# Patient Record
Sex: MALE | Race: WHITE
[De-identification: names, ages, dates, MRNs, and addresses within clinical notes are randomized per-mention and may not be internally consistent; named-entity substitution may affect disease eponyms.]

---

## 2018-04-07 ENCOUNTER — HOSPITAL ENCOUNTER (OUTPATIENT)
Dept: HOSPITAL 47 - EC | Age: 38
Setting detail: OBSERVATION
LOS: 1 days | Discharge: HOME | End: 2018-04-08
Attending: HOSPITALIST | Admitting: HOSPITALIST
Payer: COMMERCIAL

## 2018-04-07 VITALS — BODY MASS INDEX: 33.6 KG/M2

## 2018-04-07 DIAGNOSIS — Z82.0: ICD-10-CM

## 2018-04-07 DIAGNOSIS — Z82.49: ICD-10-CM

## 2018-04-07 DIAGNOSIS — F17.200: ICD-10-CM

## 2018-04-07 DIAGNOSIS — R07.89: Primary | ICD-10-CM

## 2018-04-07 LAB
ALBUMIN SERPL-MCNC: 3.7 G/DL (ref 3.5–5)
ALP SERPL-CCNC: 80 U/L (ref 38–126)
ALT SERPL-CCNC: 19 U/L (ref 21–72)
ANION GAP SERPL CALC-SCNC: 14 MMOL/L
APTT BLD: 24.6 SEC (ref 22–30)
AST SERPL-CCNC: 18 U/L (ref 17–59)
BASOPHILS # BLD AUTO: 0 K/UL (ref 0–0.2)
BASOPHILS NFR BLD AUTO: 0 %
BUN SERPL-SCNC: 12 MG/DL (ref 9–20)
CALCIUM SPEC-MCNC: 9.3 MG/DL (ref 8.4–10.2)
CHLORIDE SERPL-SCNC: 108 MMOL/L (ref 98–107)
CK SERPL-CCNC: 76 U/L (ref 55–170)
CO2 SERPL-SCNC: 24 MMOL/L (ref 22–30)
D DIMER PPP FEU-MCNC: 0.23 MG/L FEU (ref ?–0.6)
EOSINOPHIL # BLD AUTO: 0.4 K/UL (ref 0–0.7)
EOSINOPHIL NFR BLD AUTO: 4 %
ERYTHROCYTE [DISTWIDTH] IN BLOOD BY AUTOMATED COUNT: 5.66 M/UL (ref 4.3–5.9)
ERYTHROCYTE [DISTWIDTH] IN BLOOD: 13.8 % (ref 11.5–15.5)
GLUCOSE SERPL-MCNC: 101 MG/DL (ref 74–99)
HCT VFR BLD AUTO: 46.2 % (ref 39–53)
HGB BLD-MCNC: 15.6 GM/DL (ref 13–17.5)
INR PPP: 1.1 (ref ?–1.2)
LYMPHOCYTES # SPEC AUTO: 2.8 K/UL (ref 1–4.8)
LYMPHOCYTES NFR SPEC AUTO: 27 %
MAGNESIUM SPEC-SCNC: 2 MG/DL (ref 1.6–2.3)
MCH RBC QN AUTO: 27.6 PG (ref 25–35)
MCHC RBC AUTO-ENTMCNC: 33.8 G/DL (ref 31–37)
MCV RBC AUTO: 81.6 FL (ref 80–100)
MONOCYTES # BLD AUTO: 0.7 K/UL (ref 0–1)
MONOCYTES NFR BLD AUTO: 7 %
NEUTROPHILS # BLD AUTO: 6.2 K/UL (ref 1.3–7.7)
NEUTROPHILS NFR BLD AUTO: 61 %
PLATELET # BLD AUTO: 270 K/UL (ref 150–450)
POTASSIUM SERPL-SCNC: 4.1 MMOL/L (ref 3.5–5.1)
PROT SERPL-MCNC: 6.2 G/DL (ref 6.3–8.2)
PT BLD: 10.7 SEC (ref 9–12)
SODIUM SERPL-SCNC: 146 MMOL/L (ref 137–145)
TROPONIN I SERPL-MCNC: <0.012 NG/ML (ref 0–0.03)
WBC # BLD AUTO: 10.3 K/UL (ref 3.8–10.6)

## 2018-04-07 PROCEDURE — 96374 THER/PROPH/DIAG INJ IV PUSH: CPT

## 2018-04-07 PROCEDURE — 85610 PROTHROMBIN TIME: CPT

## 2018-04-07 PROCEDURE — 85379 FIBRIN DEGRADATION QUANT: CPT

## 2018-04-07 PROCEDURE — 99285 EMERGENCY DEPT VISIT HI MDM: CPT

## 2018-04-07 PROCEDURE — 96361 HYDRATE IV INFUSION ADD-ON: CPT

## 2018-04-07 PROCEDURE — 94760 N-INVAS EAR/PLS OXIMETRY 1: CPT

## 2018-04-07 PROCEDURE — 82550 ASSAY OF CK (CPK): CPT

## 2018-04-07 PROCEDURE — 36415 COLL VENOUS BLD VENIPUNCTURE: CPT

## 2018-04-07 PROCEDURE — 84484 ASSAY OF TROPONIN QUANT: CPT

## 2018-04-07 PROCEDURE — 80053 COMPREHEN METABOLIC PANEL: CPT

## 2018-04-07 PROCEDURE — 83735 ASSAY OF MAGNESIUM: CPT

## 2018-04-07 PROCEDURE — 82553 CREATINE MB FRACTION: CPT

## 2018-04-07 PROCEDURE — 85025 COMPLETE CBC W/AUTO DIFF WBC: CPT

## 2018-04-07 PROCEDURE — 80061 LIPID PANEL: CPT

## 2018-04-07 PROCEDURE — 93005 ELECTROCARDIOGRAM TRACING: CPT

## 2018-04-07 PROCEDURE — 85730 THROMBOPLASTIN TIME PARTIAL: CPT

## 2018-04-07 PROCEDURE — 71046 X-RAY EXAM CHEST 2 VIEWS: CPT

## 2018-04-07 NOTE — XR
EXAMINATION TYPE: XR chest 2V

 

DATE OF EXAM: 4/7/2018

 

COMPARISON: NONE

 

HISTORY: Chest pain.

 

TECHNIQUE:  Frontal and lateral views of the chest are obtained.

 

FINDINGS:  There is no focal air space opacity, pleural effusion, or pneumothorax seen.  The cardiac 
silhouette size is within normal limits.   The osseous structures are intact.

 

IMPRESSION:  No acute  process.

## 2018-04-07 NOTE — ED
Chest Pain HPI





- General


Chief Complaint: Chest Pain


Stated Complaint: Chest pain


Time Seen by Provider: 04/07/18 20:35


Source: patient


Mode of arrival: wheelchair


Limitations: no limitations





- History of Present Illness


Initial Comments: 





87 years old male comes in with a chest pain he had a chest pain today around 5:

30 and lasted for about a half an hour 45 minutes had a chest pain is all 

resolved now he was short winded of the time he had a chest pain and now he has 

a family history of heart disease his dad has a fast heart attack in her early 

40s, he said it hurts when he takes a deep breaths denies any fever no chills 

no abdominal pain no frequency urgency dysuria no symptoms of TIA or CVA





- Related Data


 Previous Rx's











 Medication  Instructions  Recorded


 


Acetaminophen-Codeine 300-30mg 1 tab PO Q6H PRN #20 tablet 01/07/15





[Tylenol #3]  


 


Ibuprofen [Motrin] 600 mg PO Q8HR PRN #30 tab 01/07/15











 Allergies











Allergy/AdvReac Type Severity Reaction Status Date / Time


 


No Known Allergies Allergy   Verified 04/07/18 20:21














Review of Systems


ROS Statement: 


Those systems with pertinent positive or pertinent negative responses have been 

documented in the HPI.





ROS Other: All systems not noted in ROS Statement are negative.





EKG Findings





- EKG Comments:


EKG Findings:: EKG is sinus rhythm ventricular rate is 96 HI interval is 172 

QRS duration is 100 QT/QTc C is 54/447 review of this EKG does not reveal any 

ST elevation or ST depression





Past Medical History


Past Medical History: No Reported History


History of Any Multi-Drug Resistant Organisms: None Reported


Past Surgical History: Appendectomy, Orthopedic Surgery, Tonsillectomy


Additional Past Surgical History / Comment(s): RIGHT  AND LEFT KNEE,


Past Psychological History: No Psychological Hx Reported


Smoking Status: Current every day smoker


Past Alcohol Use History: Occasional


Past Drug Use History: None Reported





General Exam





- General Exam Comments


Initial Comments: 


General:  The patient is awake and alert, in no distress, and does not appear 

acutely ill. 


Skin:  Skin is warm and dry and no rashes or lesions are noted. 


Eye:  Pupils are equal, round and reactive to light, extra-ocular movements are 

intact; there is normal conjunctiva bilaterally.  


Ears, nose, mouth and throat:  There are moist mucous membranes and no oral 

lesions. 


Neck:  The neck is supple, there is no tenderness  or JVD.  


Cardiovascular:  There is a regular rate and rhythm. No murmur, rub or gallop 

is appreciated.


Respiratory: To auscultation bilateral, no wheezing no rhonchi no distress  

respiratory wise noticed


Gastrointestinal:  Soft, non-distended, non-tender abdomen without masses or 

organomegaly noted. There is no rebound or guarding present. Bowel sounds are 

unremarkable. 


Back:  There is no tenderness to palpation in the midline. There is no obvious 

deformity.


Musculoskeletal:  Normal ROM, no tenderness, There is no pedal edema. There is 

no calf tenderness or swelling. No cords were appreciated.  


Neurological:  CN II-XII intact, Cranial nerves III through XII are intact. 

There are no obvious motor or sensory deficits. Coordination appears grossly 

intact. Speech is normal.


Psychiatric:  Cooperative, appropriate mood & affect, normal judgment.  








Limitations: no limitations





Course





 Vital Signs











  04/07/18 04/07/18





  20:17 22:40


 


Temperature 97.9 F 


 


Pulse Rate 103 H 84


 


Respiratory 18 16





Rate  


 


Blood Pressure 140/78 127/61


 


O2 Sat by Pulse 98 98





Oximetry  








During the assessment notices EKG is normal so his chest x-ray d-dimer is 

unremarkable upon and is unremarkable CBC and compressive metabolic panel all 

within normal range considering his 37 considering is a smoker his dad has a 

heart attack in early 40s he took observed overnight for 3 sets of cardiac 

markers and consult with the cardiology





Disposition


Clinical Impression: 


 Chest pain, Family history of heart disease





Disposition: ADMITTED AS IP TO THIS HOSP


Condition: Good


Referrals: 


None,Stated [Primary Care Provider] - 1-2 days

## 2018-04-08 VITALS
DIASTOLIC BLOOD PRESSURE: 77 MMHG | SYSTOLIC BLOOD PRESSURE: 135 MMHG | TEMPERATURE: 98 F | HEART RATE: 81 BPM | RESPIRATION RATE: 16 BRPM

## 2018-04-08 LAB
CHOLEST SERPL-MCNC: 147 MG/DL (ref ?–200)
CK SERPL-CCNC: 46 U/L (ref 55–170)
CK SERPL-CCNC: 47 U/L (ref 55–170)
HDLC SERPL-MCNC: 31 MG/DL (ref 40–60)
LDLC SERPL CALC-MCNC: 99 MG/DL (ref 0–99)
TRIGL SERPL-MCNC: 85 MG/DL (ref ?–150)
TROPONIN I SERPL-MCNC: <0.012 NG/ML (ref 0–0.03)
TROPONIN I SERPL-MCNC: <0.012 NG/ML (ref 0–0.03)

## 2018-04-08 NOTE — P.CRDCN
History of Present Illness


Consult date: 04/08/18


Chief complaint: Chest pain


History of present illness: 





This is a pleasant 37-year-old gentleman with no significant past medical 

history with history of smoking and history of coronary artery disease in his 

father presented to the hospital complaining of chest discomfort.  He described 

the discomfort as a sharp kind of discomfort, in the mid of the chest, without 

any radiation to the arm or neck or shoulders and without any associated 

symptoms of shortness of breath, sweating, dizziness or lightheadedness, or 

syncope.





The chest discomfort on physical examination is clearly reproducible.  The 

cardiac enzymes were checked and came in to be unremarkable.  The EKG did not 

show any ischemic changes.





I did recommend obtaining a stress test to rule out any severe underlying CAD 

with his family history and with a history of smoking with the patient would 

like to be discharged home and have it done as an outpatient.  I will get him 

up and around and if he is asymptomatic he might be able to be discharged home.





Past Medical History


Past Medical History: No Reported History


History of Any Multi-Drug Resistant Organisms: None Reported


Past Surgical History: Appendectomy, Orthopedic Surgery, Tonsillectomy


Additional Past Surgical History / Comment(s): RIGHT  AND LEFT KNEE,


Past Anesthesia/Blood Transfusion Reactions: No Reported Reaction


Past Psychological History: No Psychological Hx Reported


Smoking Status: Current every day smoker


Past Alcohol Use History: Occasional


Past Drug Use History: None Reported





- Past Family History


  ** Mother


Family Medical History: Seizure Disorder





  ** Father


Additional Family Medical History / Comment(s): AICD





Medications and Allergies


 Home Medications











 Medication  Instructions  Recorded  Confirmed  Type


 


Acetaminophen-Codeine 300-30mg 1 tab PO Q6H PRN #20 tablet 01/07/15  Rx





[Tylenol #3]    


 


Ibuprofen [Motrin] 600 mg PO Q8HR PRN #30 tab 01/07/15  Rx











 Allergies











Allergy/AdvReac Type Severity Reaction Status Date / Time


 


No Known Allergies Allergy   Verified 04/07/18 20:21














Physical Exam


Vitals: 


 Vital Signs











  Temp Pulse Pulse Resp BP BP Pulse Ox


 


 04/08/18 08:00  97.8 F   67  18   133/65  97


 


 04/08/18 07:25        94 L


 


 04/08/18 04:00  98.0 F   60  18   132/71  96


 


 04/08/18 00:13  97.9 F   76  16   109/76  99


 


 04/07/18 22:40   84   16  127/61   98


 


 04/07/18 20:17  97.9 F  103 H   18  140/78   98








 Intake and Output











 04/07/18 04/08/18 04/08/18





 22:59 06:59 14:59


 


Other:   


 


  Voiding Method   Toilet


 


  Weight 118.841 kg 118.841 kg 














- Constitutional


General appearance: no acute distress





- Respiratory


Respiratory: bilateral: CTA





- Cardiovascular


Rhythm: regular


Heart sounds: normal: S1, S2





Results





 04/07/18 21:19





 04/07/18 21:19


 Cardiac Enzymes











  04/07/18 04/07/18 04/08/18 Range/Units





  21:19 21:19 02:39 


 


AST   18   (17-59)  U/L


 


CK-MB (CK-2)  0.4   0.5  (0.0-2.4)  ng/mL


 


Troponin I  <0.012   <0.012  (0.000-0.034)  ng/mL














  04/08/18 Range/Units





  09:13 


 


AST   (17-59)  U/L


 


CK-MB (CK-2)  0.6  (0.0-2.4)  ng/mL


 


Troponin I  <0.012  (0.000-0.034)  ng/mL








 Coagulation











  04/07/18 Range/Units





  21:19 


 


PT  10.7  (9.0-12.0)  sec


 


APTT  24.6  (22.0-30.0)  sec








 Lipids











  04/08/18 Range/Units





  02:39 


 


Triglycerides  85  (<150)  mg/dL


 


Cholesterol  147  (<200)  mg/dL


 


HDL Cholesterol  31 L  (40-60)  mg/dL








 CBC











  04/07/18 Range/Units





  21:19 


 


WBC  10.3  (3.8-10.6)  k/uL


 


RBC  5.66  (4.30-5.90)  m/uL


 


Hgb  15.6  (13.0-17.5)  gm/dL


 


Hct  46.2  (39.0-53.0)  %


 


Plt Count  270  (150-450)  k/uL








 Comprehensive Metabolic Panel











  04/07/18 Range/Units





  21:19 


 


Sodium  146 H  (137-145)  mmol/L


 


Potassium  4.1  (3.5-5.1)  mmol/L


 


Chloride  108 H  ()  mmol/L


 


Carbon Dioxide  24  (22-30)  mmol/L


 


BUN  12  (9-20)  mg/dL


 


Creatinine  1.10  (0.66-1.25)  mg/dL


 


Glucose  101 H  (74-99)  mg/dL


 


Calcium  9.3  (8.4-10.2)  mg/dL


 


AST  18  (17-59)  U/L


 


ALT  19 L  (21-72)  U/L


 


Alkaline Phosphatase  80  ()  U/L


 


Total Protein  6.2 L  (6.3-8.2)  g/dL


 


Albumin  3.7  (3.5-5.0)  g/dL








 Current Medications











Generic Name Dose Route Start Last Admin





  Trade Name Freq  PRN Reason Stop Dose Admin


 


Acetaminophen/Codeine Phosphate  1 each  04/07/18 22:51  





  Tylenol #3  PO   





  Q6H PRN   





  MODERATE TO SEVERE PAIN   


 


Aspirin  325 mg  04/08/18 09:00  04/08/18 10:02





  Aspirin  PO   325 mg





  DAILY ASHELY   Administration


 


Atorvastatin Calcium  40 mg  04/08/18 21:00  





  Lipitor  PO   





  HS ASHELY   


 


Ibuprofen  600 mg  04/07/18 22:51  





  Motrin  PO   





  Q8HR PRN   





  MILD TO MODERATE PAIN   


 


Morphine Sulfate  12 mg  04/07/18 22:47  





  Morphine Oral Sol 2mg/Ml  PO   





  Q5M PRN   





  Chest Pain   


 


Nitroglycerin  0.4 mg  04/07/18 22:47  





  Nitrostat  SUBLINGUAL   





  Q5M PRN   





  Chest Pain   








 Intake and Output











 04/07/18 04/08/18 04/08/18





 22:59 06:59 14:59


 


Other:   


 


  Voiding Method   Toilet


 


  Weight 118.841 kg 118.841 kg 








 





 04/07/18 21:19 





 04/07/18 21:19 











Assessment and Plan


Assessment: 





Assessment


#1 atypical/reproducible chest pain


#2 history of smoking





Plan


#1 the patient was ruled out for acute coronary event


#2 he would like to be discharged home.





I will follow-up with the patient in the office as an outpatient and schedule 

stress test to be done as an outpatient as well.

## 2018-04-08 NOTE — P.DS
Providers


Date of admission: 


04/07/18 22:47





Attending physician: 


Mahsa Abreu





Consults: 





 





04/07/18 22:47


Consult Physician Urgent 


   Consulting Provider: Lui Marin


   Consult Reason/Comments: Chest pain


   Do you want consulting provider notified?: Yes











Primary care physician: 


Stated None





Hospital Course: 


Please refer to my HPI





Patient Condition at Discharge: Good





Plan - Discharge Summary


New Discharge Prescriptions: 


New


   Ibuprofen [Motrin] 400 mg PO Q8HR PRN #30 tab


     PRN Reason: MILD TO MODERATE PAIN


   Ranitidine HCl [Zantac] 150 mg PO BID #30 tab


Discharge Medication List





Ibuprofen [Motrin] 400 mg PO Q8HR PRN #30 tab 04/08/18 [Rx]


Ranitidine HCl [Zantac] 150 mg PO BID #30 tab 04/08/18 [Rx]








Follow up Appointment(s)/Referral(s): 


Jesus Powell MD [STAFF PHYSICIAN] - 2 Weeks


None,Stated [Primary Care Provider] - 1-2 days


Discharge Disposition: HOME SELF-CARE

## 2018-04-08 NOTE — P.HPIM
History of Present Illness


37-year-old gentleman with no significant past medical history with history of 

smoking and history of coronary artery disease in his father presented to the 

hospital complaining of chest discomfort.  He described the discomfort as a 

sharp kind of discomfort, in the mid of the chest, without any radiation to the 

arm or neck or shoulders and without any associated symptoms of shortness of 

breath, sweating, dizziness or lightheadedness, or syncope.





The chest discomfort on physical examination is clearly reproducible.  The 

cardiac enzymes were checked and came in to be unremarkable.  No significant 

EKG changes.  Troponins were negative.  Patient will get an outpatient stress 

is cleared for discharge from cardiology perspective.  Patient chest pain is 

only 5/10 in severity nonradiating now.  Constant.  Not associated with food 

nonpleuritic in nature





Review of Systems


REVIEW OF SYSTEMS: 


CONSTITUTIONAL: No fever, no malaise, no fatigue. 


HEENT: No recent visual problems or hearing problems. Denied any sore throat. 


CARDIOVASCULAR: No chest pain, orthopnea, PND, no palpitations, no syncope. 


PULMONARY: No shortness of breath, no cough, no hemoptysis. 


GASTROINTESTINAL: No diarrhea, no nausea, no vomiting, no abdominal pain. 

Normoactive bowel sounds. 


NEUROLOGICAL: No headaches, no weakness, no numbness. 


HEMATOLOGICAL: Denies any bleeding or petechiae. 


GENITOURINARY: Denies any burning micturition, frequency, or urgency. 


MUSCULOSKELETAL/RHEUMATOLOGICAL: Denies any joint pain, swelling, or any muscle 

pain. 


ENDOCRINE: Denies any polyuria or polydipsia. 





The rest of the 14-point review of systems is negative.








Past Medical History


Past Medical History: No Reported History


History of Any Multi-Drug Resistant Organisms: None Reported


Past Surgical History: Appendectomy, Orthopedic Surgery, Tonsillectomy


Additional Past Surgical History / Comment(s): RIGHT  AND LEFT KNEE,


Past Anesthesia/Blood Transfusion Reactions: No Reported Reaction


Past Psychological History: No Psychological Hx Reported


Smoking Status: Current every day smoker


Past Alcohol Use History: Occasional


Past Drug Use History: None Reported





- Past Family History


  ** Mother


Family Medical History: Seizure Disorder





  ** Father


Additional Family Medical History / Comment(s): AICD





Medications and Allergies


 Home Medications











 Medication  Instructions  Recorded  Confirmed  Type


 


Ibuprofen [Motrin] 400 mg PO Q8HR PRN #30 tab 04/08/18  Rx


 


Ranitidine HCl [Zantac] 150 mg PO BID #30 tab 04/08/18  Rx











 Allergies











Allergy/AdvReac Type Severity Reaction Status Date / Time


 


No Known Allergies Allergy   Verified 04/08/18 11:10














Physical Exam


Vitals: 


 Vital Signs











  Temp Pulse Pulse Pulse Resp BP BP


 


 04/08/18 12:00  98.0 F   67  81  16   135/77


 


 04/08/18 08:00  97.8 F   67   18   133/65


 


 04/08/18 07:25       


 


 04/08/18 04:00  98.0 F   60   18   132/71


 


 04/08/18 00:13  97.9 F   76   16   109/76


 


 04/07/18 22:40   84    16  127/61 


 


 04/07/18 20:17  97.9 F  103 H    18  140/78 














  Pulse Ox


 


 04/08/18 12:00  98


 


 04/08/18 08:00  97


 


 04/08/18 07:25  94 L


 


 04/08/18 04:00  96


 


 04/08/18 00:13  99


 


 04/07/18 22:40  98


 


 04/07/18 20:17  98








 Intake and Output











 04/08/18 04/08/18 04/08/18





 06:59 14:59 22:59


 


Other:   


 


  Voiding Method  Toilet 


 


  Weight 118.841 kg 118.841 kg 











PHYSICAL EXAMINATION: 





GENERAL: The patient is alert and oriented x3, not in any acute distress. Well 

developed, well nourished. 


HEENT: Pupils are round and equally reacting to light. EOMI. No scleral 

icterus. No conjunctival pallor. Normocephalic, atraumatic. No pharyngeal 

erythema. No thyromegaly. 


CARDIOVASCULAR: S1 and S2 present. No murmurs, rubs, or gallops. 


PULMONARY: Chest is clear to auscultation, no wheezing or crackles. 


ABDOMEN: Soft, nontender, nondistended, normoactive bowel sounds. No palpable 

organomegaly. 


MUSCULOSKELETAL: No joint swelling or deformity.


EXTREMITIES: No cyanosis, clubbing, or pedal edema. 


NEUROLOGICAL: Gross neurological examination did not reveal any focal deficits. 


SKIN: No rashes. 











Results


CBC & Chem 7: 


 04/07/18 21:19





 04/07/18 21:19


Labs: 


 Abnormal Lab Results - Last 24 Hours (Table)











  04/07/18 04/08/18 04/08/18 Range/Units





  21:19 02:39 02:39 


 


Sodium  146 H    (137-145)  mmol/L


 


Chloride  108 H    ()  mmol/L


 


Glucose  101 H    (74-99)  mg/dL


 


ALT  19 L    (21-72)  U/L


 


Total Creatine Kinase   46 L   ()  U/L


 


Total Protein  6.2 L    (6.3-8.2)  g/dL


 


HDL Cholesterol    31 L  (40-60)  mg/dL














  04/08/18 Range/Units





  09:13 


 


Sodium   (137-145)  mmol/L


 


Chloride   ()  mmol/L


 


Glucose   (74-99)  mg/dL


 


ALT   (21-72)  U/L


 


Total Creatine Kinase  47 L  ()  U/L


 


Total Protein   (6.3-8.2)  g/dL


 


HDL Cholesterol   (40-60)  mg/dL














Assessment and Plan


Plan: 


-Chest pain atypical nature musculoskeletal ruled out acute coronary syndromes.

  Patient was ruled out acute coronary syndromes is being discharged today 

patient will follow with Dr. Powell for outpatient stress test.


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